# Patient Record
Sex: FEMALE | Race: WHITE | NOT HISPANIC OR LATINO | Employment: UNEMPLOYED | URBAN - NONMETROPOLITAN AREA
[De-identification: names, ages, dates, MRNs, and addresses within clinical notes are randomized per-mention and may not be internally consistent; named-entity substitution may affect disease eponyms.]

---

## 2022-10-21 ENCOUNTER — APPOINTMENT (EMERGENCY)
Dept: RADIOLOGY | Facility: HOSPITAL | Age: 57
End: 2022-10-21
Payer: COMMERCIAL

## 2022-10-21 ENCOUNTER — APPOINTMENT (EMERGENCY)
Dept: CT IMAGING | Facility: HOSPITAL | Age: 57
End: 2022-10-21
Payer: COMMERCIAL

## 2022-10-21 ENCOUNTER — HOSPITAL ENCOUNTER (EMERGENCY)
Facility: HOSPITAL | Age: 57
Discharge: HOME | End: 2022-10-21
Attending: EMERGENCY MEDICINE
Payer: COMMERCIAL

## 2022-10-21 VITALS
TEMPERATURE: 98.2 F | OXYGEN SATURATION: 94 % | RESPIRATION RATE: 16 BRPM | HEART RATE: 102 BPM | WEIGHT: 102.13 LBS | SYSTOLIC BLOOD PRESSURE: 145 MMHG | DIASTOLIC BLOOD PRESSURE: 95 MMHG

## 2022-10-21 DIAGNOSIS — J44.9 COPD (CHRONIC OBSTRUCTIVE PULMONARY DISEASE) (HCC): ICD-10-CM

## 2022-10-21 DIAGNOSIS — M54.2 NECK PAIN: Primary | ICD-10-CM

## 2022-10-21 LAB
ANION GAP SERPL CALCULATED.3IONS-SCNC: 10 MMOL/L (ref 4–13)
BASOPHILS # BLD AUTO: 0.02 THOUSANDS/ÂΜL (ref 0–0.1)
BASOPHILS NFR BLD AUTO: 0 % (ref 0–1)
BUN SERPL-MCNC: 6 MG/DL (ref 5–25)
CALCIUM SERPL-MCNC: 8 MG/DL (ref 8.3–10.1)
CHLORIDE SERPL-SCNC: 99 MMOL/L (ref 96–108)
CO2 SERPL-SCNC: 26 MMOL/L (ref 21–32)
CREAT SERPL-MCNC: 0.58 MG/DL (ref 0.6–1.3)
EOSINOPHIL # BLD AUTO: 0.08 THOUSAND/ÂΜL (ref 0–0.61)
EOSINOPHIL NFR BLD AUTO: 1 % (ref 0–6)
ERYTHROCYTE [DISTWIDTH] IN BLOOD BY AUTOMATED COUNT: 12.8 % (ref 11.6–15.1)
GFR SERPL CREATININE-BSD FRML MDRD: 102 ML/MIN/1.73SQ M
GLUCOSE SERPL-MCNC: 102 MG/DL (ref 65–140)
HCT VFR BLD AUTO: 45.8 % (ref 34.8–46.1)
HGB BLD-MCNC: 15.7 G/DL (ref 11.5–15.4)
IMM GRANULOCYTES # BLD AUTO: 0.02 THOUSAND/UL (ref 0–0.2)
IMM GRANULOCYTES NFR BLD AUTO: 0 % (ref 0–2)
LYMPHOCYTES # BLD AUTO: 2.45 THOUSANDS/ÂΜL (ref 0.6–4.47)
LYMPHOCYTES NFR BLD AUTO: 34 % (ref 14–44)
MCH RBC QN AUTO: 30.9 PG (ref 26.8–34.3)
MCHC RBC AUTO-ENTMCNC: 34.3 G/DL (ref 31.4–37.4)
MCV RBC AUTO: 90 FL (ref 82–98)
MONOCYTES # BLD AUTO: 0.73 THOUSAND/ÂΜL (ref 0.17–1.22)
MONOCYTES NFR BLD AUTO: 10 % (ref 4–12)
NEUTROPHILS # BLD AUTO: 3.84 THOUSANDS/ÂΜL (ref 1.85–7.62)
NEUTS SEG NFR BLD AUTO: 55 % (ref 43–75)
NRBC BLD AUTO-RTO: 0 /100 WBCS
PLATELET # BLD AUTO: 206 THOUSANDS/UL (ref 149–390)
PMV BLD AUTO: 9 FL (ref 8.9–12.7)
POTASSIUM SERPL-SCNC: 3.7 MMOL/L (ref 3.5–5.3)
RBC # BLD AUTO: 5.08 MILLION/UL (ref 3.81–5.12)
SODIUM SERPL-SCNC: 135 MMOL/L (ref 135–147)
WBC # BLD AUTO: 7.14 THOUSAND/UL (ref 4.31–10.16)

## 2022-10-21 PROCEDURE — 99284 EMERGENCY DEPT VISIT MOD MDM: CPT | Performed by: EMERGENCY MEDICINE

## 2022-10-21 PROCEDURE — 80048 BASIC METABOLIC PNL TOTAL CA: CPT | Performed by: EMERGENCY MEDICINE

## 2022-10-21 PROCEDURE — 71046 X-RAY EXAM CHEST 2 VIEWS: CPT

## 2022-10-21 PROCEDURE — 85025 COMPLETE CBC W/AUTO DIFF WBC: CPT | Performed by: EMERGENCY MEDICINE

## 2022-10-21 PROCEDURE — 99284 EMERGENCY DEPT VISIT MOD MDM: CPT

## 2022-10-21 PROCEDURE — G1004 CDSM NDSC: HCPCS

## 2022-10-21 PROCEDURE — 70491 CT SOFT TISSUE NECK W/DYE: CPT

## 2022-10-21 PROCEDURE — 36415 COLL VENOUS BLD VENIPUNCTURE: CPT | Performed by: EMERGENCY MEDICINE

## 2022-10-21 RX ORDER — BENZONATATE 100 MG/1
100 CAPSULE ORAL EVERY 8 HOURS
Qty: 21 CAPSULE | Refills: 0 | Status: SHIPPED | OUTPATIENT
Start: 2022-10-21

## 2022-10-21 RX ORDER — BENZONATATE 100 MG/1
100 CAPSULE ORAL ONCE
Status: COMPLETED | OUTPATIENT
Start: 2022-10-21 | End: 2022-10-21

## 2022-10-21 RX ORDER — ALBUTEROL SULFATE 90 UG/1
2 AEROSOL, METERED RESPIRATORY (INHALATION) ONCE
Status: COMPLETED | OUTPATIENT
Start: 2022-10-21 | End: 2022-10-21

## 2022-10-21 RX ORDER — LIDOCAINE 50 MG/G
1 PATCH TOPICAL DAILY
Qty: 15 PATCH | Refills: 0 | Status: SHIPPED | OUTPATIENT
Start: 2022-10-21 | End: 2022-11-05

## 2022-10-21 RX ADMIN — IOHEXOL 85 ML: 350 INJECTION, SOLUTION INTRAVENOUS at 17:30

## 2022-10-21 RX ADMIN — BENZONATATE 100 MG: 100 CAPSULE ORAL at 16:37

## 2022-10-21 RX ADMIN — ALBUTEROL SULFATE 2 PUFF: 90 AEROSOL, METERED RESPIRATORY (INHALATION) at 16:59

## 2022-10-21 NOTE — ED PROVIDER NOTES
History  Chief Complaint   Patient presents with   • Neck Pain     Patient c/o of a lump on her neck for 4 years  Patient also complaining of a cough for over 1 year  Patient having increased pain and pressure  Patient is a 80-year-old female presenting to the emergency department complaining mass to her left posterior neck that is been present for the past 4 years as well as a chronic cough that is been present for at least the past year, she states she recently saw her PCP and ultrasound of the neck mass was ordered, she had a follow-up at a doctor's office today for it but was unable to be seen because she did not have a co-pay so she came to the emergency department for evaluation, she reports she 1st noted a small size lump about 4 years ago and since then it has been increasing in size, now giving her some pain and pressure in the back of the neck, she also reports that her cough is sometimes productive, she has no fever, no chest pain, she is a smoker, smoking approximately 1 pack per day, she reports that she has not had any imaging of her neck done but was told to follow-up with a neck surgeon          None       History reviewed  No pertinent past medical history  Past Surgical History:   Procedure Laterality Date   • FRACTURE SURGERY     • HYSTERECTOMY         History reviewed  No pertinent family history  I have reviewed and agree with the history as documented  E-Cigarette/Vaping   • E-Cigarette Use Never User      E-Cigarette/Vaping Substances     Social History     Tobacco Use   • Smoking status: Current Every Day Smoker     Packs/day: 1 00     Types: Cigarettes   • Smokeless tobacco: Never Used   Vaping Use   • Vaping Use: Never used   Substance Use Topics   • Alcohol use: Not Currently   • Drug use: Not Currently       Review of Systems   Constitutional: Negative  HENT: Negative  Eyes: Negative  Respiratory: Positive for cough  Cardiovascular: Negative      Gastrointestinal: Negative  Endocrine: Negative  Genitourinary: Negative  Musculoskeletal: Positive for neck pain  Skin: Negative  Allergic/Immunologic: Negative  Neurological: Negative  Hematological: Negative  Psychiatric/Behavioral: Negative  Physical Exam  Physical Exam  Constitutional:       Appearance: She is well-developed  HENT:      Head: Normocephalic and atraumatic  Nose: Nose normal       Mouth/Throat:      Mouth: Mucous membranes are moist    Eyes:      Conjunctiva/sclera: Conjunctivae normal       Pupils: Pupils are equal, round, and reactive to light  Neck:        Comments: Patient reports tenderness in the right paraspinal musculature, no palpable mass, no erythema, no fluctuance, full range of motion without difficulty  Cardiovascular:      Rate and Rhythm: Normal rate and regular rhythm  Heart sounds: Normal heart sounds  Pulmonary:      Effort: Pulmonary effort is normal       Breath sounds: Normal air entry  Wheezing present  Comments: Faint wheezes, nonproductive cough  Chest:      Comments: Faint wheezes heard on  Abdominal:      Palpations: Abdomen is soft  Musculoskeletal:         General: Normal range of motion  Cervical back: Normal range of motion and neck supple  Skin:     General: Skin is warm and dry  Neurological:      Mental Status: She is alert and oriented to person, place, and time           Vital Signs  ED Triage Vitals   Temperature Pulse Respirations Blood Pressure SpO2   10/21/22 1621 10/21/22 1621 10/21/22 1828 10/21/22 1621 10/21/22 1621   98 2 °F (36 8 °C) 87 19 151/80 97 %      Temp Source Heart Rate Source Patient Position - Orthostatic VS BP Location FiO2 (%)   10/21/22 1621 10/21/22 1621 10/21/22 1621 10/21/22 1621 --   Oral Monitor Lying Left arm       Pain Score       10/21/22 1621       No Pain           Vitals:    10/21/22 1621 10/21/22 1900   BP: 151/80 145/95   Pulse: 87 102   Patient Position - Orthostatic VS: Lying Lying ED Medications  Medications   albuterol (PROVENTIL HFA,VENTOLIN HFA) inhaler 2 puff (2 puffs Inhalation Given 10/21/22 1659)   benzonatate (TESSALON PERLES) capsule 100 mg (100 mg Oral Given 10/21/22 1637)   iohexol (OMNIPAQUE) 350 MG/ML injection (SINGLE-DOSE) 85 mL (85 mL Intravenous Given 10/21/22 1730)       Diagnostic Studies  Results Reviewed     Procedure Component Value Units Date/Time    Basic metabolic panel [[id number]]  (Abnormal) Collected: 10/21/22 1637    Lab Status: Final result Specimen: Blood from Hand, Right Updated: 10/21/22 1653     Sodium 135 mmol/L      Potassium 3 7 mmol/L      Chloride 99 mmol/L      CO2 26 mmol/L      ANION GAP 10 mmol/L      BUN 6 mg/dL      Creatinine 0 58 mg/dL      Glucose 102 mg/dL      Calcium 8 0 mg/dL      eGFR 102 ml/min/1 73sq m     Narrative:      [place] guidelines for Chronic Kidney Disease (CKD):   •  Stage 1 with normal or high GFR (GFR > 90 mL/min/1 73 square meters)  •  Stage 2 Mild CKD (GFR = 60-89 mL/min/1 73 square meters)  •  Stage 3A Moderate CKD (GFR = 45-59 mL/min/1 73 square meters)  •  Stage 3B Moderate CKD (GFR = 30-44 mL/min/1 73 square meters)  •  Stage 4 Severe CKD (GFR = 15-29 mL/min/1 73 square meters)  •  Stage 5 End Stage CKD (GFR <15 mL/min/1 73 square meters)  Note: GFR calculation is accurate only with a steady state creatinine    CBC and differential [[id number]]  (Abnormal) Collected: 10/21/22 1637    Lab Status: Final result Specimen: Blood from Hand, Right Updated: 10/21/22 1644     WBC 7 14 Thousand/uL      RBC 5 08 Million/uL      Hemoglobin 15 7 g/dL      Hematocrit 45 8 %      MCV 90 fL      MCH 30 9 pg      MCHC 34 3 g/dL      RDW 12 8 %      MPV 9 0 fL      Platelets 797 Thousands/uL      nRBC 0 /100 WBCs      Neutrophils Relative 55 %      Immat GRANS % 0 %      Lymphocytes Relative 34 %      Monocytes Relative 10 %      Eosinophils Relative 1 %      Basophils Relative 0 % Neutrophils Absolute 3 84 Thousands/µL      Immature Grans Absolute 0 02 Thousand/uL      Lymphocytes Absolute 2 45 Thousands/µL      Monocytes Absolute 0 73 Thousand/µL      Eosinophils Absolute 0 08 Thousand/µL      Basophils Absolute 0 02 Thousands/µL                  CT soft tissue neck   Final Result by Yanet Limon MD (10/21 1908)      No cervical mass lesion or pathologic adenopathy  Workstation performed: [id number]         XR chest 2 views   Final Result by Ashley Arias MD (10/21 1701)      No acute cardiopulmonary disease  Workstation performed: [id number]                             ED Course  ED Course as of 10/21/22 2256   Fri Oct 21, 2022   2255 Lab and imaging findings discussed with patient at bedside which are unremarkable, symptoms consistent with COPD from chronic smoking and possible cervical strain, patient advised to follow-up with PCP as needed, discontinue smoking, return if symptoms worsen, patient acknowledges understanding and agreement with this plan                               SBIRT 22yo+    Flowsheet Row Most Recent Value   SBIRT (23 yo +)    In order to provide better care to our patients, we are screening all of our patients for alcohol and drug use  Would it be okay to ask you these screening questions? Yes Filed at: 10/21/2022 1646   Initial Alcohol Screen: US AUDIT-C     1  How often do you have a drink containing alcohol? 0 Filed at: 10/21/2022 1646   2  How many drinks containing alcohol do you have on a typical day you are drinking? 0 Filed at: 10/21/2022 1646   3a  Male UNDER 65: How often do you have five or more drinks on one occasion? 0 Filed at: 10/21/2022 1646   3b  FEMALE Any Age, or MALE 65+: How often do you have 4 or more drinks on one occassion? 0 Filed at: 10/21/2022 1646   Audit-C Score 0 Filed at: 10/21/2022 1646   ZAK: How many times in the past year have you        Used an illegal drug or used a prescription medication for non-medical reasons? Never Filed at: 10/21/2022 1646                      Disposition  Final diagnoses:   Neck pain   COPD (chronic obstructive pulmonary disease) ([facility] [address] )     Time reflects when diagnosis was documented in both MDM as applicable and the Disposition within this note     Time User Action Codes Description Comment    10/21/2022  7:13 PM Quirino Aleman [M54 2] Neck pain     10/21/2022  7:13 PM Tony Herron [J44 9] COPD (chronic obstructive pulmonary disease) [facility])       ED Disposition     ED Disposition   Discharge    Condition   Stable    Date/Time   Fri Oct 21, 2022  7:13 PM    Comment   [facility] discharge to home/self care  Follow-up Information    None         Discharge Medication List as of 10/21/2022  7:17 PM      START taking these medications    Details   benzonatate (TESSALON PERLES) 100 mg capsule Take 1 capsule (100 mg total) by mouth every 8 (eight) hours, Starting Fri 10/21/2022, Normal      lidocaine (Lidoderm) 5 % Apply 1 patch topically daily for 15 days Remove & Discard patch within 12 hours or as directed by MD, Starting Fri 10/21/2022, Until Sat 11/5/2022, Normal             No discharge procedures on file      PDMP Review     None          ED Provider  Electronically Signed by           Param Garnett DO  10/21/22 5097

## 2024-10-21 ENCOUNTER — APPOINTMENT (EMERGENCY)
Dept: RADIOLOGY | Facility: HOSPITAL | Age: 59
End: 2024-10-21
Payer: COMMERCIAL

## 2024-10-21 ENCOUNTER — APPOINTMENT (EMERGENCY)
Dept: CT IMAGING | Facility: HOSPITAL | Age: 59
End: 2024-10-21
Payer: COMMERCIAL

## 2024-10-21 ENCOUNTER — HOSPITAL ENCOUNTER (EMERGENCY)
Facility: HOSPITAL | Age: 59
Discharge: HOME | End: 2024-10-21
Attending: EMERGENCY MEDICINE
Payer: COMMERCIAL

## 2024-10-21 VITALS
OXYGEN SATURATION: 98 % | SYSTOLIC BLOOD PRESSURE: 185 MMHG | WEIGHT: 198 LBS | HEIGHT: 63 IN | HEART RATE: 85 BPM | TEMPERATURE: 98.1 F | DIASTOLIC BLOOD PRESSURE: 110 MMHG | BODY MASS INDEX: 35.08 KG/M2 | RESPIRATION RATE: 18 BRPM

## 2024-10-21 DIAGNOSIS — M25.512 LEFT SHOULDER PAIN: Primary | ICD-10-CM

## 2024-10-21 DIAGNOSIS — I16.0 HYPERTENSIVE URGENCY: ICD-10-CM

## 2024-10-21 LAB
ALBUMIN SERPL BCG-MCNC: 4.1 G/DL (ref 3.5–5)
ALP SERPL-CCNC: 69 U/L (ref 34–104)
ALT SERPL W P-5'-P-CCNC: 17 U/L (ref 7–52)
ANION GAP SERPL CALCULATED.3IONS-SCNC: 11 MMOL/L (ref 4–13)
APTT PPP: 23 SECONDS (ref 23–34)
AST SERPL W P-5'-P-CCNC: 19 U/L (ref 13–39)
BASOPHILS # BLD AUTO: 0.04 THOUSANDS/ΜL (ref 0–0.1)
BASOPHILS NFR BLD AUTO: 1 % (ref 0–1)
BILIRUB SERPL-MCNC: 0.33 MG/DL (ref 0.2–1)
BUN SERPL-MCNC: 8 MG/DL (ref 5–25)
CALCIUM SERPL-MCNC: 8.7 MG/DL (ref 8.4–10.2)
CARDIAC TROPONIN I PNL SERPL HS: 4 NG/L
CHLORIDE SERPL-SCNC: 98 MMOL/L (ref 96–108)
CO2 SERPL-SCNC: 23 MMOL/L (ref 21–32)
CREAT SERPL-MCNC: 0.53 MG/DL (ref 0.6–1.3)
D DIMER PPP FEU-MCNC: <0.27 UG/ML FEU
EOSINOPHIL # BLD AUTO: 0.05 THOUSAND/ΜL (ref 0–0.61)
EOSINOPHIL NFR BLD AUTO: 1 % (ref 0–6)
ERYTHROCYTE [DISTWIDTH] IN BLOOD BY AUTOMATED COUNT: 16.3 % (ref 11.6–15.1)
GFR SERPL CREATININE-BSD FRML MDRD: 104 ML/MIN/1.73SQ M
GLUCOSE SERPL-MCNC: 97 MG/DL (ref 65–140)
HCT VFR BLD AUTO: 36.5 % (ref 34.8–46.1)
HGB BLD-MCNC: 11.9 G/DL (ref 11.5–15.4)
IMM GRANULOCYTES # BLD AUTO: 0.02 THOUSAND/UL (ref 0–0.2)
IMM GRANULOCYTES NFR BLD AUTO: 0 % (ref 0–2)
INR PPP: 0.81 (ref 0.85–1.19)
LYMPHOCYTES # BLD AUTO: 1.46 THOUSANDS/ΜL (ref 0.6–4.47)
LYMPHOCYTES NFR BLD AUTO: 18 % (ref 14–44)
MAGNESIUM SERPL-MCNC: 1.9 MG/DL (ref 1.9–2.7)
MCH RBC QN AUTO: 25.4 PG (ref 26.8–34.3)
MCHC RBC AUTO-ENTMCNC: 32.6 G/DL (ref 31.4–37.4)
MCV RBC AUTO: 78 FL (ref 82–98)
MONOCYTES # BLD AUTO: 0.75 THOUSAND/ΜL (ref 0.17–1.22)
MONOCYTES NFR BLD AUTO: 10 % (ref 4–12)
NEUTROPHILS # BLD AUTO: 5.6 THOUSANDS/ΜL (ref 1.85–7.62)
NEUTS SEG NFR BLD AUTO: 70 % (ref 43–75)
NRBC BLD AUTO-RTO: 0 /100 WBCS
PLATELET # BLD AUTO: 290 THOUSANDS/UL (ref 149–390)
PMV BLD AUTO: 9 FL (ref 8.9–12.7)
POTASSIUM SERPL-SCNC: 4 MMOL/L (ref 3.5–5.3)
PROT SERPL-MCNC: 6.9 G/DL (ref 6.4–8.4)
PROTHROMBIN TIME: 11.6 SECONDS (ref 12.3–15)
RBC # BLD AUTO: 4.68 MILLION/UL (ref 3.81–5.12)
SODIUM SERPL-SCNC: 132 MMOL/L (ref 135–147)
WBC # BLD AUTO: 7.92 THOUSAND/UL (ref 4.31–10.16)

## 2024-10-21 PROCEDURE — 93005 ELECTROCARDIOGRAM TRACING: CPT

## 2024-10-21 PROCEDURE — 83735 ASSAY OF MAGNESIUM: CPT | Performed by: EMERGENCY MEDICINE

## 2024-10-21 PROCEDURE — 96374 THER/PROPH/DIAG INJ IV PUSH: CPT

## 2024-10-21 PROCEDURE — 96375 TX/PRO/DX INJ NEW DRUG ADDON: CPT

## 2024-10-21 PROCEDURE — 85610 PROTHROMBIN TIME: CPT | Performed by: EMERGENCY MEDICINE

## 2024-10-21 PROCEDURE — 80053 COMPREHEN METABOLIC PANEL: CPT | Performed by: EMERGENCY MEDICINE

## 2024-10-21 PROCEDURE — 36415 COLL VENOUS BLD VENIPUNCTURE: CPT | Performed by: EMERGENCY MEDICINE

## 2024-10-21 PROCEDURE — 84484 ASSAY OF TROPONIN QUANT: CPT | Performed by: EMERGENCY MEDICINE

## 2024-10-21 PROCEDURE — 99284 EMERGENCY DEPT VISIT MOD MDM: CPT

## 2024-10-21 PROCEDURE — 85730 THROMBOPLASTIN TIME PARTIAL: CPT | Performed by: EMERGENCY MEDICINE

## 2024-10-21 PROCEDURE — 99285 EMERGENCY DEPT VISIT HI MDM: CPT | Performed by: EMERGENCY MEDICINE

## 2024-10-21 PROCEDURE — 85025 COMPLETE CBC W/AUTO DIFF WBC: CPT | Performed by: EMERGENCY MEDICINE

## 2024-10-21 PROCEDURE — 85379 FIBRIN DEGRADATION QUANT: CPT | Performed by: EMERGENCY MEDICINE

## 2024-10-21 PROCEDURE — 73030 X-RAY EXAM OF SHOULDER: CPT

## 2024-10-21 PROCEDURE — 72125 CT NECK SPINE W/O DYE: CPT

## 2024-10-21 RX ORDER — METOPROLOL TARTRATE 1 MG/ML
5 INJECTION, SOLUTION INTRAVENOUS ONCE
Status: DISCONTINUED | OUTPATIENT
Start: 2024-10-21 | End: 2024-10-21

## 2024-10-21 RX ORDER — CLONIDINE HYDROCHLORIDE 0.1 MG/1
0.2 TABLET ORAL ONCE
Status: COMPLETED | OUTPATIENT
Start: 2024-10-21 | End: 2024-10-21

## 2024-10-21 RX ORDER — METHYLPREDNISOLONE SODIUM SUCCINATE 125 MG/2ML
125 INJECTION, POWDER, LYOPHILIZED, FOR SOLUTION INTRAMUSCULAR; INTRAVENOUS ONCE
Status: COMPLETED | OUTPATIENT
Start: 2024-10-21 | End: 2024-10-21

## 2024-10-21 RX ORDER — METHOCARBAMOL 500 MG/1
500 TABLET, FILM COATED ORAL 2 TIMES DAILY
Qty: 20 TABLET | Refills: 0 | Status: SHIPPED | OUTPATIENT
Start: 2024-10-21

## 2024-10-21 RX ORDER — HYDRALAZINE HYDROCHLORIDE 20 MG/ML
10 INJECTION INTRAMUSCULAR; INTRAVENOUS ONCE
Status: DISCONTINUED | OUTPATIENT
Start: 2024-10-21 | End: 2024-10-21

## 2024-10-21 RX ORDER — LIDOCAINE 50 MG/G
1 PATCH TOPICAL DAILY
Qty: 7 PATCH | Refills: 0 | Status: SHIPPED | OUTPATIENT
Start: 2024-10-21 | End: 2024-10-28

## 2024-10-21 RX ORDER — PREDNISONE 20 MG/1
40 TABLET ORAL DAILY
Qty: 10 TABLET | Refills: 0 | Status: SHIPPED | OUTPATIENT
Start: 2024-10-21 | End: 2024-10-26

## 2024-10-21 RX ORDER — KETOROLAC TROMETHAMINE 30 MG/ML
15 INJECTION, SOLUTION INTRAMUSCULAR; INTRAVENOUS ONCE
Status: COMPLETED | OUTPATIENT
Start: 2024-10-21 | End: 2024-10-21

## 2024-10-21 RX ORDER — NAPROXEN 500 MG/1
500 TABLET ORAL 2 TIMES DAILY WITH MEALS
Qty: 30 TABLET | Refills: 0 | Status: SHIPPED | OUTPATIENT
Start: 2024-10-21

## 2024-10-21 RX ORDER — HYDRALAZINE HYDROCHLORIDE 20 MG/ML
10 INJECTION INTRAMUSCULAR; INTRAVENOUS ONCE
Status: COMPLETED | OUTPATIENT
Start: 2024-10-21 | End: 2024-10-21

## 2024-10-21 RX ADMIN — METHYLPREDNISOLONE SODIUM SUCCINATE 125 MG: 125 INJECTION, POWDER, FOR SOLUTION INTRAMUSCULAR; INTRAVENOUS at 17:10

## 2024-10-21 RX ADMIN — CLONIDINE HYDROCHLORIDE 0.2 MG: 0.1 TABLET ORAL at 17:17

## 2024-10-21 RX ADMIN — HYDRALAZINE HYDROCHLORIDE 10 MG: 20 INJECTION INTRAMUSCULAR; INTRAVENOUS at 17:12

## 2024-10-21 RX ADMIN — KETOROLAC TROMETHAMINE 15 MG: 30 INJECTION, SOLUTION INTRAMUSCULAR; INTRAVENOUS at 17:08

## 2024-10-21 NOTE — ED PROVIDER NOTES
Time reflects when diagnosis was documented in both MDM as applicable and the Disposition within this note       Time User Action Codes Description Comment    10/21/2024  5:54 PM Eugenio Lobo Add [M25.512] Left shoulder pain     10/21/2024  5:54 PM Eugenio Lobo Add [I16.0] Hypertensive urgency           ED Disposition       ED Disposition   Discharge    Condition   Stable    Date/Time   Mon Oct 21, 2024  5:54 PM    Comment   Annalise Lopez discharge to home/self care.                   Assessment & Plan       Medical Decision Making  Amount and/or Complexity of Data Reviewed  Labs: ordered. Decision-making details documented in ED Course.  Radiology: ordered and independent interpretation performed. Decision-making details documented in ED Course.  ECG/medicine tests: ordered and independent interpretation performed. Decision-making details documented in ED Course.  Discussion of management or test interpretation with external provider(s): Differential diagnosis includes but not limited to STEMI, NSTEMI, PE, pneumonia, pneumothorax, musculoskeletal chest pain, costochondritis, gastritis, cholelithiasis, contusion, strain    Risk  Prescription drug management.        ED Course as of 10/21/24 1756   Mon Oct 21, 2024   1749 D-Dimer, Quant: <0.27   1749 hs TnI 0hr: 4   1751 Discussed with patient lab and CAT scan results.  Patient has no tenderness in the ear.  No pain in the posterior regular area.  Doubt acute mastoiditis.  Symptoms been ongoing for the past 6 months.  Sound musculoskeletal in origin as he gets worse with certain movements and positions.  Patient also noted to have hypertensive urgency.  No signs of endorgan damage.       Medications   methylPREDNISolone sodium succinate (Solu-MEDROL) injection 125 mg (125 mg Intravenous Given 10/21/24 1710)   ketorolac (TORADOL) injection 15 mg (15 mg Intravenous Given 10/21/24 1708)   hydrALAZINE (APRESOLINE) injection 10 mg (10 mg Intravenous Given  "10/21/24 1712)   cloNIDine (CATAPRES) tablet 0.2 mg (0.2 mg Oral Given 10/21/24 1717)       ED Risk Strat Scores                           SBIRT 22yo+      Flowsheet Row Most Recent Value   Initial Alcohol Screen: US AUDIT-C     1. How often do you have a drink containing alcohol? 0 Filed at: 10/21/2024 1630   2. How many drinks containing alcohol do you have on a typical day you are drinking?  0 Filed at: 10/21/2024 1630   3a. Male UNDER 65: How often do you have five or more drinks on one occasion? 0 Filed at: 10/21/2024 1630   3b. FEMALE Any Age, or MALE 65+: How often do you have 4 or more drinks on one occassion? 0 Filed at: 10/21/2024 1630   Audit-C Score 0 Filed at: 10/21/2024 1630   ZAK: How many times in the past year have you...    Used an illegal drug or used a prescription medication for non-medical reasons? Never Filed at: 10/21/2024 1630                            History of Present Illness       Chief Complaint   Patient presents with    Arm Pain     \" I did something to my arm a few months ago\". Has pain in L arm, unsure of trauma.        History reviewed. No pertinent past medical history.   Past Surgical History:   Procedure Laterality Date    FRACTURE SURGERY      HYSTERECTOMY        History reviewed. No pertinent family history.   Social History     Tobacco Use    Smoking status: Every Day     Current packs/day: 1.00     Types: Cigarettes    Smokeless tobacco: Never   Vaping Use    Vaping status: Never Used   Substance Use Topics    Alcohol use: Not Currently    Drug use: Not Currently      E-Cigarette/Vaping    E-Cigarette Use Never User       E-Cigarette/Vaping Substances      I have reviewed and agree with the history as documented.     Patient complaining of left neck/left shoulder pain has been ongoing for the past 6 months.  Called her PCP in April but never got imaging studies done.  Worse with movement.  Nothing taken for her symptoms.  No chest pain.  No shortness of breath.  No nausea " or vomiting.      History provided by:  Patient   used: No    Arm Pain  Location:  Left shoulder  Quality:  Achy  Severity:  Mild  Onset quality:  Gradual  Duration:  6 months  Timing:  Constant  Progression:  Unchanged  Chronicity:  New  Context:  Left shoulder left neck pain  Relieved by:  Nothing  Worsened by:  Movement and certain positions  Ineffective treatments:  None tried  Associated symptoms: no abdominal pain, no chest pain, no congestion, no cough, no diarrhea, no ear pain, no fever, no headaches, no myalgias, no nausea, no rash, no rhinorrhea, no shortness of breath, no sore throat, no vomiting and no wheezing        Review of Systems   Constitutional:  Negative for chills and fever.   HENT:  Negative for congestion, ear pain, hearing loss, rhinorrhea, sore throat, trouble swallowing and voice change.    Eyes:  Negative for pain and discharge.   Respiratory:  Negative for cough, shortness of breath and wheezing.    Cardiovascular:  Negative for chest pain and palpitations.   Gastrointestinal:  Negative for abdominal pain, blood in stool, constipation, diarrhea, nausea and vomiting.   Genitourinary:  Negative for dysuria, flank pain, frequency and hematuria.   Musculoskeletal:  Negative for joint swelling, myalgias, neck pain and neck stiffness.   Skin:  Negative for rash and wound.   Neurological:  Negative for dizziness, seizures, syncope, facial asymmetry and headaches.   Psychiatric/Behavioral:  Negative for hallucinations, self-injury and suicidal ideas.    All other systems reviewed and are negative.          Objective       ED Triage Vitals   Temperature Pulse Blood Pressure Respirations SpO2 Patient Position - Orthostatic VS   10/21/24 1628 10/21/24 1628 10/21/24 1628 10/21/24 1628 10/21/24 1628 10/21/24 1628   98.1 °F (36.7 °C) 87 (!) 222/115 18 97 % Sitting      Temp Source Heart Rate Source BP Location FiO2 (%) Pain Score    10/21/24 1628 10/21/24 1640 10/21/24 1628 --  10/21/24 1628    Tympanic Monitor Right arm  9      Vitals      Date and Time Temp Pulse SpO2 Resp BP Pain Score FACES Pain Rating User   10/21/24 1744 -- -- -- -- 185/110 -- -- SP   10/21/24 1708 -- -- -- -- -- 8 -- NB   10/21/24 1640 -- 85 98 % 18 215/119 -- -- NB   10/21/24 1628 98.1 °F (36.7 °C) 87 97 % 18 222/115 9 -- LAK            Physical Exam  Vitals and nursing note reviewed.   Constitutional:       General: She is not in acute distress.     Appearance: She is well-developed.   HENT:      Head: Normocephalic and atraumatic.      Right Ear: External ear normal.      Left Ear: External ear normal.   Eyes:      General: No scleral icterus.        Right eye: No discharge.         Left eye: No discharge.      Extraocular Movements: Extraocular movements intact.      Conjunctiva/sclera: Conjunctivae normal.   Neck:      Comments: Tender along the left paracervical and left trapezius muscles.  Cardiovascular:      Rate and Rhythm: Normal rate and regular rhythm.      Heart sounds: Normal heart sounds. No murmur heard.  Pulmonary:      Effort: Pulmonary effort is normal.      Breath sounds: Normal breath sounds. No wheezing or rales.   Abdominal:      General: Bowel sounds are normal. There is no distension.      Palpations: Abdomen is soft.      Tenderness: There is no abdominal tenderness. There is no guarding or rebound.   Musculoskeletal:         General: No deformity. Normal range of motion.      Cervical back: Normal range of motion and neck supple.   Skin:     General: Skin is warm and dry.      Findings: No rash.   Neurological:      General: No focal deficit present.      Mental Status: She is alert and oriented to person, place, and time.      Cranial Nerves: No cranial nerve deficit.   Psychiatric:         Mood and Affect: Mood normal.         Behavior: Behavior normal.         Thought Content: Thought content normal.         Judgment: Judgment normal.         Results Reviewed       Procedure Component  Value Units Date/Time    HS Troponin 0hr (reflex protocol) [[id number]]  (Normal) Collected: 10/21/24 1705    Lab Status: Final result Specimen: Blood from Arm, Right Updated: 10/21/24 1747     hs TnI 0hr 4 ng/L     D-Dimer [[id number]]  (Normal) Collected: 10/21/24 1705    Lab Status: Final result Specimen: Blood from Arm, Right Updated: 10/21/24 1742     D-Dimer, Quant <0.27 ug/ml FEU     Narrative:      In the evaluation for possible pulmonary embolism, in the appropriate (Well's Score of 4 or less) patient, the age adjusted d-dimer cutoff for this patient can be calculated as:    Age x 0.01 (in ug/mL) for Age-adjusted D-dimer exclusion threshold for a patient over 50 years.    Comprehensive metabolic panel [[id number]]  (Abnormal) Collected: 10/21/24 1705    Lab Status: Final result Specimen: Blood from Arm, Right Updated: 10/21/24 1740     Sodium 132 mmol/L      Potassium 4.0 mmol/L      Chloride 98 mmol/L      CO2 23 mmol/L      ANION GAP 11 mmol/L      BUN 8 mg/dL      Creatinine 0.53 mg/dL      Glucose 97 mg/dL      Calcium 8.7 mg/dL      AST 19 U/L      ALT 17 U/L      Alkaline Phosphatase 69 U/L      Total Protein 6.9 g/dL      Albumin 4.1 g/dL      Total Bilirubin 0.33 mg/dL      eGFR 104 ml/min/1.73sq m     Narrative:      [employer] Kid[employer] guidelines for Chronic Kidney Disease (CKD):     Stage 1 with normal or high GFR (GFR > 90 mL/min/1.73 square meters)    Stage 2 Mild CKD (GFR = 60-89 mL/min/1.73 square meters)    Stage 3A Moderate CKD (GFR = 45-59 mL/min/1.73 square meters)    Stage 3B Moderate CKD (GFR = 30-44 mL/min/1.73 square meters)    Stage 4 Severe CKD (GFR = 15-29 mL/min/1.73 square meters)    Stage 5 End Stage CKD (GFR <15 mL/min/1.73 square meters)  Note: GFR calculation is accurate only with a steady state creatinine    Magnesium [[id number]]  (Normal) Collected: 10/21/24 1705    Lab Status: Final result Specimen: Blood from Arm, Right Updated: 10/21/24 1740     Magnesium 1.9  mg/dL     Protime-INR [[id number]]  (Abnormal) Collected: 10/21/24 1705    Lab Status: Final result Specimen: Blood from Arm, Right Updated: 10/21/24 1737     Protime 11.6 seconds      INR 0.81    Narrative:      INR Therapeutic Range    Indication                                             INR Range      Atrial Fibrillation                                               2.0-3.0  Hypercoagulable State                                    2.0.2.3  Left Ventricular Asist Device                            2.0-3.0  Mechanical Heart Valve                                  -    Aortic(with afib, MI, embolism, HF, LA enlargement,    and/or coagulopathy)                                     2.0-3.0 (2.5-3.5)     Mitral                                                             2.5-3.5  Prosthetic/Bioprosthetic Heart Valve               2.0-3.0  Venous thromboembolism (VTE: VT, PE        2.0-3.0    APTT [[id number]]  (Normal) Collected: 10/21/24 1705    Lab Status: Final result Specimen: Blood from Arm, Right Updated: 10/21/24 1737     PTT 23 seconds     CBC and differential [[id number]]  (Abnormal) Collected: 10/21/24 1705    Lab Status: Final result Specimen: Blood from Arm, Right Updated: 10/21/24 1718     WBC 7.92 Thousand/uL      RBC 4.68 Million/uL      Hemoglobin 11.9 g/dL      Hematocrit 36.5 %      MCV 78 fL      MCH 25.4 pg      MCHC 32.6 g/dL      RDW 16.3 %      MPV 9.0 fL      Platelets 290 Thousands/uL      nRBC 0 /100 WBCs      Segmented % 70 %      Immature Grans % 0 %      Lymphocytes % 18 %      Monocytes % 10 %      Eosinophils Relative 1 %      Basophils Relative 1 %      Absolute Neutrophils 5.60 Thousands/µL      Absolute Immature Grans 0.02 Thousand/uL      Absolute Lymphocytes 1.46 Thousands/µL      Absolute Monocytes 0.75 Thousand/µL      Eosinophils Absolute 0.05 Thousand/µL      Basophils Absolute 0.04 Thousands/µL             XR shoulder 2+ views LEFT   ED Interpretation by Eugenio Lobo MD (10/21  1700)   No fracture or dislocation      CT cervical spine without contrast   Final Interpretation by Deon Velazquez MD (10/21 3139)      No acute cervical spine fracture or traumatic malalignment.      Mild disc and facet degenerative change without evidence of nerve root impingement.      Mild left mastoid effusion.            Workstation performed: [id number]             ECG 12 Lead Documentation Only    Date/Time: 10/21/2024 5:16 PM    Performed by: Eugenio Lobo MD  Authorized by: Eugenio Lobo MD    ECG reviewed by me, the ED Provider: yes    Patient location:  ED  Rate:     ECG rate:  70  Rhythm:     Rhythm: sinus rhythm    Ectopy:     Ectopy: none    QRS:     QRS axis:  Normal      ED Medication and Procedure Management   Prior to Admission Medications   Prescriptions Last Dose Informant Patient Reported? Taking?   benzonatate (TESSALON PERLES) 100 mg capsule   No No   Sig: Take 1 capsule (100 mg total) by mouth every 8 (eight) hours   lidocaine (Lidoderm) 5 %   No No   Sig: Apply 1 patch topically daily for 15 days Remove & Discard patch within 12 hours or as directed by MD      Facility-Administered Medications: None     Patient's Medications   Discharge Prescriptions    LIDOCAINE (LIDODERM) 5 %    Apply 1 patch topically over 12 hours daily for 7 days Apply to left shoulder/neck area of pain.  Remove & Discard patch within 12 hours or as directed by MD       Start Date: 10/21/2024End Date: 10/28/2024       Order Dose: 1 patch       Quantity: 7 patch    Refills: 0    METHOCARBAMOL (ROBAXIN) 500 MG TABLET    Take 1 tablet (500 mg total) by mouth 2 (two) times a day       Start Date: 10/21/2024End Date: --       Order Dose: 500 mg       Quantity: 20 tablet    Refills: 0    NAPROXEN (NAPROSYN) 500 MG TABLET    Take 1 tablet (500 mg total) by mouth 2 (two) times a day with meals       Start Date: 10/21/2024End Date: --       Order Dose: 500 mg       Quantity: 30 tablet    Refills: 0    PREDNISONE  20 MG TABLET    Take 2 tablets (40 mg total) by mouth daily for 5 days       Start Date: 10/21/2024End Date: 10/26/2024       Order Dose: 40 mg       Quantity: 10 tablet    Refills: 0     No discharge procedures on file.  ED SEPSIS DOCUMENTATION   Time reflects when diagnosis was documented in both MDM as applicable and the Disposition within this note       Time User Action Codes Description Comment    10/21/2024  5:54 PM Eugenio Lobo [M25.512] Left shoulder pain     10/21/2024  5:54 PM Eugenio Lobo [I16.0] Hypertensive urgency                  Eugenio Lobo MD  10/21/24 0871

## 2024-10-21 NOTE — DISCHARGE INSTRUCTIONS
Please follow-up with your family doctor.  You been having symptoms since April.  You will need follow-up care.  You will also need to follow-up with your family doctor concerning your blood pressure.  Medication adjustment may be needed.  Call your family doctor tomorrow.

## 2024-10-22 LAB
ATRIAL RATE: 73 BPM
P AXIS: 43 DEGREES
PR INTERVAL: 158 MS
QRS AXIS: -6 DEGREES
QRSD INTERVAL: 78 MS
QT INTERVAL: 416 MS
QTC INTERVAL: 458 MS
T WAVE AXIS: 29 DEGREES
VENTRICULAR RATE: 73 BPM

## 2024-10-22 PROCEDURE — 93010 ELECTROCARDIOGRAM REPORT: CPT | Performed by: INTERNAL MEDICINE
